# Patient Record
Sex: MALE | Race: WHITE | NOT HISPANIC OR LATINO | URBAN - METROPOLITAN AREA
[De-identification: names, ages, dates, MRNs, and addresses within clinical notes are randomized per-mention and may not be internally consistent; named-entity substitution may affect disease eponyms.]

---

## 2020-02-04 ENCOUNTER — EMERGENCY (EMERGENCY)
Facility: HOSPITAL | Age: 50
LOS: 1 days | Discharge: HOME | End: 2020-02-04
Attending: EMERGENCY MEDICINE | Admitting: EMERGENCY MEDICINE
Payer: COMMERCIAL

## 2020-02-04 VITALS
TEMPERATURE: 98 F | RESPIRATION RATE: 18 BRPM | DIASTOLIC BLOOD PRESSURE: 58 MMHG | SYSTOLIC BLOOD PRESSURE: 109 MMHG | HEIGHT: 69 IN | WEIGHT: 139.99 LBS | OXYGEN SATURATION: 98 % | HEART RATE: 63 BPM
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
## 2020-02-04 DIAGNOSIS — S61.401A UNSPECIFIED OPEN WOUND OF RIGHT HAND, INITIAL ENCOUNTER: ICD-10-CM
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
## 2020-02-04 PROCEDURE — 12001 RPR S/N/AX/GEN/TRNK 2.5CM/<: CPT

## 2020-02-04 PROCEDURE — 99283 EMERGENCY DEPT VISIT LOW MDM: CPT | Mod: 25

## 2020-02-04 PROCEDURE — 99282 EMERGENCY DEPT VISIT SF MDM: CPT | Mod: 25

## 2020-02-04 RX ORDER — LIDOCAINE HYDROCHLORIDE AND EPINEPHRINE 10; 10 MG/ML; UG/ML
10 INJECTION, SOLUTION INFILTRATION; PERINEURAL ONCE
Refills: 0 | Status: COMPLETED | OUTPATIENT
Start: 2020-02-04 | End: 2020-02-04

## 2020-02-04 RX ADMIN — LIDOCAINE HYDROCHLORIDE AND EPINEPHRINE 10 MILLILITER(S): 10; 10 INJECTION, SOLUTION INFILTRATION; PERINEURAL at 22:09

## 2020-02-04 NOTE — ED ADULT TRIAGE NOTE - CHIEF COMPLAINT QUOTE
Presents to ED for laceration to right palm after using band saw.  Reports having tetanus within last 10 yrs.

## 2020-02-04 NOTE — ED PROVIDER NOTE - PHYSICAL EXAMINATION
CONSTITUTIONAL: Well appearing, well nourished, awake, alert and in no apparent distress.  MUSCULOSKELETAL: R palmar aspect of hand w/ 1x1cm linear V shaped laceration w/ adipose tissue protruding. Able to clench fish and extend fingers. No numbness. R/U/M distribution intact.   NEUROLOGICAL: Alert and oriented, no focal deficits, no motor or sensory deficits.  PSYCHIATRIC: Alert and oriented to person, place, time/situation. normal mood and affect. no apparent risk to self or others.

## 2020-02-04 NOTE — ED PROVIDER NOTE - OBJECTIVE STATEMENT
50 y/o previously healthy R handed M presents to the ED with c/o R palm laceration after hitting his hand on the wooden edge of a hand saw. The injury caused a 1x1cm laceration to R hand palm. Pt denies other injuries, numbness, and paresthesia. His tetanus is UTD.

## 2020-02-04 NOTE — ED ADULT NURSE NOTE - NSIMPLEMENTINTERV_GEN_ALL_ED
Implemented All Universal Safety Interventions:  [place] to call system. Call bell, personal items and telephone within reach. Instruct patient to call for assistance. Room bathroom lighting operational. Non-slip footwear when patient is off stretcher. Physically safe environment: no spills, clutter or unnecessary equipment. Stretcher in lowest position, wheels locked, appropriate side rails in place.

## 2020-02-04 NOTE — ED PROVIDER NOTE - PATIENT PORTAL LINK FT
You can access the FollowMyHealth Patient Portal offered by [facility] by registering at the following website: [email or web address]. By joining [facility]’s FollowMyHealth portal, you will also be able to view your health information using other applications (apps) compatible with our system.

## 2020-02-04 NOTE — ED PROVIDER NOTE - CLINICAL SUMMARY MEDICAL DECISION MAKING FREE TEXT BOX
50 y/o R handed M p/w lac to R hand. Refusing x-ray currently. Will suture repair. Advised to return in 7-10 D for removal of sutures.

## 2020-02-04 NOTE — ED PROVIDER NOTE - CARE PROVIDER_API CALL
Rajinder Adams)  Plastic Surgery; Surgery  [address] [place] [address]  Phone: [phone]  Fax: [phone]  Follow Up Time:     Evin Alvarado)  Plastic Surgery; Surgery of the Hand  [address]  [place] [address]  Phone: [phone]  Fax: [phone]  Follow Up Time:

## 2020-02-04 NOTE — ED PROVIDER NOTE - NSFOLLOWUPINSTRUCTIONS_ED_ALL_ED_FT
Laceration    WHAT YOU NEED TO KNOW:    A laceration is an injury to the skin and the soft tissue underneath it. Lacerations can happen anywhere on the body.    DISCHARGE INSTRUCTIONS:    Seek care immediately if:     You have heavy bleeding or bleeding that does not stop after 10 minutes of holding firm, direct pressure over the wound.      Your wound opens up.    Call your doctor if:     You have a fever or chills.      Your laceration is red, warm, or swollen.      You have red streaks on your skin coming from your wound.      You have white or yellow drainage from the wound that smells bad.      You have pain that gets worse, even after treatment.      You have questions or concerns about your condition or care.    Medicines: You may need any of the following:     Prescription pain medicine may be given. Ask your healthcare provider how to take this medicine safely. Some prescription pain medicines contain acetaminophen. Do not take other medicines that contain acetaminophen without talking to your healthcare provider. Too much acetaminophen may cause liver damage. Prescription pain medicine may cause constipation. Ask your healthcare provider how to prevent or treat constipation.       Antibiotics help treat or prevent a bacterial infection.      Take your medicine as directed. Contact your healthcare provider if you think your medicine is not helping or if you have side effects. Tell him or her if you are allergic to any medicine. Keep a list of the medicines, vitamins, and herbs you take. Include the amounts, and when and why you take them. Bring the list or the pill bottles to follow-up visits. Carry your medicine list with you in case of an emergency.    Care for your wound as directed:     Do not get your wound wet until your healthcare provider says it is okay. Do not soak your wound in water. Do not go swimming until your healthcare provider says it is okay. Carefully wash the wound with soap and water. Gently pat the area dry or allow it to air dry.      Change your bandages when they get wet, dirty, or after washing. Apply new, clean bandages as directed. Do not apply elastic bandages or tape too tight. Do not put powders or lotions over your incision.      Apply antibiotic ointment as directed. Your healthcare provider may give you antibiotic ointment to put over your wound if you have stitches. If you have strips of tape over your incision, let them dry up and fall off on their own. If they do not fall off within 14 days, gently remove them. If you have glue over your wound, do not remove or pick at it. If your glue comes off, do not replace it with glue that you have at home.      Check your wound every day for signs of infection, such as swelling, redness, or pus.    Self-care:     Apply ice on your wound for 15 to 20 minutes every hour or as directed. Use an ice pack, or put crushed ice in a plastic bag. Cover it with a towel. Ice helps prevent tissue damage and decreases swelling and pain.      Use a splint as directed. A splint will decrease movement and stress on your wound. It may help it heal faster. A splint may be used for lacerations over joints or areas of your body that bend. Ask your healthcare provider how to apply and remove a splint.      Decrease scarring of your wound by applying ointments as directed. Do not apply ointments until your healthcare provider says it is okay. You may need to wait until your wound is healed. Ask which ointment to buy and how often to use it. After your wound is healed, use sunscreen over the area when you are out in the sun. You should do this for at least 6 months to 1 year after your injury.    Follow up with your doctor as directed: You will need to return in 3 to 14 days to have stitches or staples removed. Write down your questions so you remember to ask them during your visits.       © Copyright [employer] 2020       back to top         Stitches Removal    WHAT YOU NEED TO KNOW:    Stitches are usually removed within 14 days, depending on the location of the wound. Your healthcare provider will tell you when to return to have your stitches removed. Your provider will use sterile forceps or tweezers to  the knot of each stitch. He or she will cut the stitch with scissors and pull the stitch out. You may feel a slight tug as the stitch comes out.    DISCHARGE INSTRUCTIONS:    Seek care immediately if:     Your wound splits open or is starting to come apart.      You suddenly cannot move your injured joint.      You have sudden numbness around your wound.      You see red streaks coming from your wound.    Contact your healthcare provider if:     You have a fever and chills.      Your wound is red, warm, swollen, or leaking pus.      There is a bad smell coming from your wound.      You have increased pain in the wound area.      You have questions or concerns about your condition or care.    Care for the area after the stitches are removed:     Do not pull medical tape off. Your provider may place small strips of medical tape across your wound after the stitches have been removed. These strips will peel and fall of on their own. Do not pull them off.      Clean the area as directed. Carefully wash the area with soap and water. Pat the area dry with a clean towel. Check the area for signs of infection, such as redness, swelling, or pus. Also check that the wound is not coming apart.      Protect your wound. Your wound can swell, bleed, or split open if it is stretched or bumped. You may need to wear a bandage that supports your wound until it is completely healed.      Care for a scar. You may have a scar after the stitches are removed. Use sunblock if the area is exposed to the sun. Apply it every day after the stitches are removed. This will help prevent skin discoloration. Talk to your healthcare provider about medicines you can use to make the scar less visible. Some medicines are available without a prescription.    Follow up with your healthcare provider as directed: You may need to return in 3 to 5 days if the stitches are on your face. Stitches on your scalp need to be removed after 7 to 14 days. Stitches over joints may remain in place up to 14 days. Write down your questions so you remember to ask them during your visits.                      © Copyright [employer] 2020